# Patient Record
Sex: MALE | Race: WHITE | Employment: FULL TIME | URBAN - METROPOLITAN AREA
[De-identification: names, ages, dates, MRNs, and addresses within clinical notes are randomized per-mention and may not be internally consistent; named-entity substitution may affect disease eponyms.]

---

## 2017-02-21 PROBLEM — Z78.9 ALCOHOL CONSUMPTION TWO TO FOUR DAYS PER WEEK: Status: ACTIVE | Noted: 2017-02-21

## 2017-02-21 PROBLEM — G47.00 INSOMNIA: Status: ACTIVE | Noted: 2017-02-21

## 2017-02-21 PROBLEM — M48.061 LUMBAR CANAL STENOSIS: Status: ACTIVE | Noted: 2017-02-21

## 2017-02-21 PROBLEM — R79.89 ELEVATED LFTS: Status: ACTIVE | Noted: 2017-02-21

## 2020-08-19 ENCOUNTER — OFFICE VISIT (OUTPATIENT)
Dept: ORTHOPEDIC SURGERY | Age: 60
End: 2020-08-19
Payer: COMMERCIAL

## 2020-08-19 VITALS — TEMPERATURE: 97.9 F

## 2020-08-19 PROCEDURE — 20550 NJX 1 TENDON SHEATH/LIGAMENT: CPT | Performed by: PHYSICIAN ASSISTANT

## 2020-08-19 PROCEDURE — 99203 OFFICE O/P NEW LOW 30 MIN: CPT | Performed by: PHYSICIAN ASSISTANT

## 2020-08-19 RX ORDER — BETAMETHASONE SODIUM PHOSPHATE AND BETAMETHASONE ACETATE 3; 3 MG/ML; MG/ML
0.5 INJECTION, SUSPENSION INTRA-ARTICULAR; INTRALESIONAL; INTRAMUSCULAR; SOFT TISSUE ONCE
Status: COMPLETED | OUTPATIENT
Start: 2020-08-19 | End: 2020-08-19

## 2020-08-19 RX ORDER — LIDOCAINE HYDROCHLORIDE 10 MG/ML
1 INJECTION, SOLUTION EPIDURAL; INFILTRATION; INTRACAUDAL; PERINEURAL ONCE
Status: COMPLETED | OUTPATIENT
Start: 2020-08-19 | End: 2020-08-19

## 2020-08-19 RX ADMIN — LIDOCAINE HYDROCHLORIDE 1 ML: 10 INJECTION, SOLUTION EPIDURAL; INFILTRATION; INTRACAUDAL; PERINEURAL at 14:36

## 2020-08-19 RX ADMIN — BETAMETHASONE SODIUM PHOSPHATE AND BETAMETHASONE ACETATE 0.48 MG: 3; 3 INJECTION, SUSPENSION INTRA-ARTICULAR; INTRALESIONAL; INTRAMUSCULAR; SOFT TISSUE at 14:35

## 2020-08-19 ASSESSMENT — ENCOUNTER SYMPTOMS
VOMITING: 0
NAUSEA: 0
COLOR CHANGE: 0

## 2020-08-19 NOTE — PROGRESS NOTES
Patient ID: Ethan Swann is a 61 y.o. male. Chief Complaint   Patient presents with    Hand Pain     left hand pain no testing         HPI  Mr. Sarabjit Sanabria is a gentleman who presents for evaluation of chronic left thumb pain. Patient states he has had this pain for approximately 1 year however over the past several months it has progressively worsened. Pain is most severe to the radial aspect of the wrist and the base of the thumb. It is aggravated by specific movements of the thumb. Pain is relieved by bracing, icing and rest.  Patient states about the time the pain started he believes he hyperextended his thumb when attempting to put on a tight glove at work and since then has had pain. He denies any numbness or tingling in the hand. He denies any pain to any of the digits or the wrist itself. Past Medical History:   Diagnosis Date    Cerebral concussion 1992    Chronic back pain     DDD    Headache(784.0)     migraines    Kidney stone     Lumbar canal stenosis      Past Surgical History:   Procedure Laterality Date    CARPAL TUNNEL RELEASE Left     CARPAL TUNNEL RELEASE Right 7/21/14    COLONOSCOPY  2013    No polyps, Dr. Alysa Huizar.  HEMORRHOID SURGERY  2009    PILONIDAL CYST EXCISION  1980    SPINE SURGERY Left 1983    L5-S1     Family History   Problem Relation Age of Onset    Diabetes Mother     Diabetes Father     Diabetes Sister     Cancer Sister 36        Colon. Passed around age 36. Social History     Occupational History    Not on file   Tobacco Use    Smoking status: Never Smoker    Smokeless tobacco: Never Used   Substance and Sexual Activity    Alcohol use: Yes     Alcohol/week: 0.0 standard drinks     Comment: weekends    Drug use: No    Sexual activity: Not on file        Review of Systems   Constitutional: Negative for chills and fever. Cardiovascular: Negative for chest pain. Gastrointestinal: Negative for nausea and vomiting.    Musculoskeletal: Positive for arthralgias (Left thumb). Negative for gait problem. Skin: Negative for color change and rash. Neurological: Negative for weakness and numbness. Physical Exam  Constitutional:       General: He is not in acute distress. Appearance: Normal appearance. He is well-developed. Neck:      Musculoskeletal: Normal range of motion and neck supple. Musculoskeletal:      Comments: left Hand/Wrist   Tenderness: Moderate tenderness to the first dorsal compartment. No tenderness to first MP or IP joints. No tenderness to anatomical snuffbox or scaphoid tubercle. No tenderness to distal radius, DRUJ or ulnar styloid     Range of Motion:     Pronation: 90    Supination: 90    Flexion: 80    Extension: 80    Hand Joints: Normal-mild pain with resisted abduction otherwise normal range of motion of the thumb     Muscle Strength     : 5/5    Wrist Extension: 5/5    Wrist Flexion: 5/5     Sensation: normal  Phalen's Sign: Negative  Tinel's Sign (Medial Nerve): Negative  Finkelstein's Test: Positive     Skin:     General: Skin is warm and dry. Neurological:      Mental Status: He is alert and oriented to person, place, and time. Assessment:     Encounter Diagnoses   Name Primary?  Left hand pain Yes    De Quervain's tenosynovitis, left          X-Rays taken in clinic today and preliminarily reviewed by me  3 views of the left hand demonstrate normal osseous alignment without any evidence of acute fracture seen. No significant degenerative changes present. Unremarkable left hand x-ray  Plan:     Mr. Regla Herrera is a 51-year-old gentleman who presents for a one-year history of left thumb pain consistent with de Quervain's tenosynovitis. Educated on the anatomy of the area as well as the extent of the issue. Discussed both nonoperative and operative intervention at this time.   I would patient will benefit from conservative management and the following recommendations are made    [facility] Quervain's injection as outlined below    Thumb spica splint    Home exercise program provided    Return to clinic in 3 weeks however should patient have any questions or concerns he may return sooner if desired    Procedure: Left 1st dorsal compartment injection  An informed verbal consent for the procedure was obtained and risks including, but not limited to: allergy to medications, injection, bleeding, stiffness of joint, recurrence of symptoms, loss of function, swelling, drainage, irrigation, need for surgery and pseudo-septic inflammation, were explained to the patient. Also, discussed was the potential for further injections, irrigation and debridement and surgery. Alternate means of treatment have also been discussed with the patient. Under sterile conditions the left first dorsal compartment is prepped with Betadine and alcohol. Using a 25-gauge needle a 1.5 cc mixture of 1 cc of 1% lidocaine without epinephrine and 0.5 cc 6 mg/mL Celestone. A bandage is applied to the injection site. Patient tolerated procedure well. Orders Placed This Encounter   Procedures    XR HAND LEFT (MIN 3 VIEWS)     Standing Status:   Future     Number of Occurrences:   1     Standing Expiration Date:   8/19/2021        ERIN Corral      Please note that this chart was generated using voicerecognition Dragon dictation software. Although every effort was made to ensurethe accuracy of this automated transcription, some errors in transcription may haveoccurred.

## 2020-10-30 ENCOUNTER — OFFICE VISIT (OUTPATIENT)
Dept: ORTHOPEDIC SURGERY | Age: 60
End: 2020-10-30
Payer: COMMERCIAL

## 2020-10-30 VITALS — RESPIRATION RATE: 14 BRPM | HEIGHT: 66 IN | WEIGHT: 170 LBS | BODY MASS INDEX: 27.32 KG/M2 | TEMPERATURE: 98 F

## 2020-10-30 PROCEDURE — 99213 OFFICE O/P EST LOW 20 MIN: CPT | Performed by: PHYSICIAN ASSISTANT

## 2020-10-30 PROCEDURE — 20550 NJX 1 TENDON SHEATH/LIGAMENT: CPT | Performed by: PHYSICIAN ASSISTANT

## 2020-10-30 RX ORDER — LIDOCAINE HYDROCHLORIDE 10 MG/ML
4 INJECTION, SOLUTION INFILTRATION; PERINEURAL ONCE
Status: COMPLETED | OUTPATIENT
Start: 2020-10-30 | End: 2020-10-30

## 2020-10-30 RX ORDER — BETAMETHASONE SODIUM PHOSPHATE AND BETAMETHASONE ACETATE 3; 3 MG/ML; MG/ML
6 INJECTION, SUSPENSION INTRA-ARTICULAR; INTRALESIONAL; INTRAMUSCULAR; SOFT TISSUE ONCE
Status: COMPLETED | OUTPATIENT
Start: 2020-10-30 | End: 2020-10-30

## 2020-10-30 RX ADMIN — BETAMETHASONE SODIUM PHOSPHATE AND BETAMETHASONE ACETATE 6 MG: 3; 3 INJECTION, SUSPENSION INTRA-ARTICULAR; INTRALESIONAL; INTRAMUSCULAR; SOFT TISSUE at 10:00

## 2020-10-30 RX ADMIN — LIDOCAINE HYDROCHLORIDE 2 ML: 10 INJECTION, SOLUTION INFILTRATION; PERINEURAL at 10:00

## 2020-10-30 NOTE — PROGRESS NOTES
321 [facility], [address] [place] [address] [facility], [address] [place] [address], [id number] [place] [address]           Dept Phone: [phone]           Dept Fax:  [phone]           Demetrius Lui          Dept Phone: [phone]           Dept Fax:  [phone]      Chief Compliant:  Chief Complaint   Patient presents with    Follow-up     Follow up from Left 1st dorsal compartment injection to left thumb on 8/19/20        History of Present Illness: This is a 61 y.o. male who presents to the clinic today for follow-up of left thumb pain. Patient was initially evaluated by me on 8/19/2020 for this left thumb pain that was consistent with de Quervain's tenosynovitis. At that time he underwent a first dorsal compartment injection and was fitted for a thumb spica brace given home exercise program.  He states this injection that he received at that time did provide significant relief of pain for approximately 1 month however his pain has gradually returned over the last 6 weeks. Despite thumb spica splint at night and home exercise program patient states his pain continues to be in the same area of the thumb. He denies any recurrent injury or trauma. He denies any knee joint warmth, redness, fever chills or numbness or tingling in the left upper extremity. Patient is requesting a repeat injection today. Past History:    Current Outpatient Medications:     ketorolac (TORADOL) 10 MG tablet, Take 10 mg by mouth every 6 hours as needed for Pain, Disp: , Rfl:     Cetirizine HCl (ZYRTEC ALLERGY) 10 MG CAPS, Take 1 tablet by mouth daily. (Patient not taking: Reported on 10/30/2020), Disp: 30 capsule, Rfl: 5  Allergies   Allergen Reactions    Pcn [Penicillins] Other (See Comments)     Questionable. Patient states he was [employer]" and doesn't remember the reaction.     Terramycin [Oxytetracycline] Other (See Comments)     Questionable. Patient states he was [employer]" and doesn't remember the reaction. Social History     Socioeconomic History    Marital status:      Spouse name: Not on file    Number of children: Not on file    Years of education: Not on file    Highest education level: Not on file   Occupational History    Not on file   Social Needs    Financial resource strain: Not on file    Food insecurity     Worry: Not on file     Inability: Not on file    Transportation needs     Medical: Not on file     Non-medical: Not on file   Tobacco Use    Smoking status: Never Smoker    Smokeless tobacco: Never Used   Substance and Sexual Activity    Alcohol use: Yes     Alcohol/week: 0.0 standard drinks     Comment: weekends    Drug use: No    Sexual activity: Not on file   Lifestyle    Physical activity     Days per week: Not on file     Minutes per session: Not on file    Stress: Not on file   Relationships    Social connections     Talks on phone: Not on file     Gets together: Not on file     Attends Buddhist service: Not on file     Active member of club or organization: Not on file     Attends meetings of clubs or organizations: Not on file     Relationship status: Not on file    Intimate partner violence     Fear of current or ex partner: Not on file     Emotionally abused: Not on file     Physically abused: Not on file     Forced sexual activity: Not on file   Other Topics Concern    Not on file   Social History Narrative    Not on file     Past Medical History:   Diagnosis Date    Cerebral concussion 1992    Chronic back pain     DDD    Headache(784.0)     migraines    Kidney stone     Lumbar canal stenosis      Past Surgical History:   Procedure Laterality Date    CARPAL TUNNEL RELEASE Left     CARPAL TUNNEL RELEASE Right 7/21/14    COLONOSCOPY  2013    No polyps, Dr. Ghada Dillon.     HEMORRHOID SURGERY  2009    PILONIDAL CYST EXCISION 36    SPINE SURGERY Left 1983    L5-S1     Family History   Problem Relation Age of Onset    Diabetes Mother     Diabetes Father     Diabetes Sister     Cancer Sister 36        Colon. Passed around age 36. Review of Systems   Constitutional: Negative for fever, chills, sweats. Eyes: Negative for changes in vision, or pain. HENT: Negative for ear ache, epistaxis, or sore throat. Respiratory/Cardio: Negative for Chest pain, palpitations, SOB, or cough. Gastrointestinal: Negative for abdominal pain, N/V/D. Genitourinary: Negative for dysuria, frequency, urgency, or hematuria. Neurological: Negative for headache, numbness, or weakness. Integumentary: Negative for rash, itching, laceration, or abrasion. Musculoskeletal: Positive for Follow-up (Follow up from Left 1st dorsal compartment injection to left thumb on 8/19/20)       Physical Exam:  Constitutional: Patient is oriented to person, place, and time. Patient appears well-developed and well nourished. HENT: Negative otherwise noted  Head: Normocephalic and Atraumatic  Nose: Normal  Eyes: Conjunctivae and EOM are normal  Neck: Normal range of motion Neck supple. Respiratory/Cardio: Effort normal. No respiratory distress. Musculoskeletal:    left Hand/Wrist    Tenderness:   Focal tenderness to the left first dorsal compartment. No tenderness to the anatomical snuffbox, scaphoid tubercle, radial styloid, DRUJ, ulnar styloid or any of the metacarpals or MP joints. Range of Motion:      Pronation: 90     Supination: 90     Flexion: 80     Extension: 70     Hand Joints: normal       Muscle Strength      : 5/5     Wrist Extension: 5/5     Wrist Flexion: 5/5       Sensation: normal   Phalen's Sign: Negative   Tinel's Sign (Medial Nerve): Negative   Finkelstein's Test: Positive     Neurological: Patient is alert and oriented to person, place, and time. Normal strenght. No sensory deficit.   Skin: Skin is warm and dry  Psychiatric: Behavior is normal. Thought content normal.  Nursing note and vitals reviewed. Labs and Imaging:     XR taken today:  No results found. Orders Placed This Encounter   Procedures    External Referral To Physical Therapy     Referral Priority:   Routine     Referral Type:   Eval and Treat     Referral Reason:   Specialty Services Required     Requested Specialty:   Physical Therapy     Number of Visits Requested:   1       Assessment and Plan:  1. De Quervain's tenosynovitis, left          PLAN:  This is a 61 y.o. male who presents to the clinic today for left de Quervain's tenosynovitis. 1.  Underwent repeat first dorsal compartment injection today in the left hand as outlined below  2. Continue with thumb spica splinting  3. Recommend referral to physical therapy for strengthening and range of motion  4. Return to clinic in 6 weeks for reevaluation        Procedure Note: Left first dorsal compartment Celestone Injection   An informed verbal consent for the procedure was obtained and risks including, but not limited to: allergy to medications, injection, bleeding, stiffness of joint, recurrence of symptoms, loss of function, swelling, drainage, irrigation, need for surgery and pseudo-septic inflammation, were explained to the patient. Also, discussed was the potential for further injections, irrigation and debridement and surgery. Alternate means of treatment have also been discussed with the patient. Following an appropriate discussion with the patient regarding the risks and benefits of the procedure he consented to proceed. his left 1st dorsal compartment of left wrist was prepped using betadine solution and alcohol swab. his left wrist was injected into the tendon sheath with a 2 cc mixture of 2 cc of 1% lidocaine without epinephrine and 1 cc of 6 mg/mL Celestone into the tendon sheath with careful avoidance of the tendon itself. A band aid was applied to the injection site.  he tolerated the injection with no immediate adverse reactions. Please note that this chart was generated using voice recognition Dragon dictation software. Although every effort was made to ensure the accuracy of this automated transcription, some errors in transcription may have occurred.

## 2021-03-31 ENCOUNTER — OFFICE VISIT (OUTPATIENT)
Dept: ORTHOPEDIC SURGERY | Age: 61
End: 2021-03-31
Payer: COMMERCIAL

## 2021-03-31 DIAGNOSIS — M65.4 DE QUERVAIN'S TENOSYNOVITIS, LEFT: Primary | ICD-10-CM

## 2021-03-31 DIAGNOSIS — M79.642 LEFT HAND PAIN: ICD-10-CM

## 2021-03-31 PROCEDURE — 99214 OFFICE O/P EST MOD 30 MIN: CPT | Performed by: PHYSICIAN ASSISTANT

## 2021-03-31 PROCEDURE — 20605 DRAIN/INJ JOINT/BURSA W/O US: CPT | Performed by: PHYSICIAN ASSISTANT

## 2021-03-31 RX ORDER — BETAMETHASONE SODIUM PHOSPHATE AND BETAMETHASONE ACETATE 3; 3 MG/ML; MG/ML
3 INJECTION, SUSPENSION INTRA-ARTICULAR; INTRALESIONAL; INTRAMUSCULAR; SOFT TISSUE ONCE
Status: COMPLETED | OUTPATIENT
Start: 2021-03-31 | End: 2021-03-31

## 2021-03-31 RX ORDER — LIDOCAINE HYDROCHLORIDE 10 MG/ML
1 INJECTION, SOLUTION INFILTRATION; PERINEURAL ONCE
Status: COMPLETED | OUTPATIENT
Start: 2021-03-31 | End: 2021-03-31

## 2021-03-31 RX ADMIN — LIDOCAINE HYDROCHLORIDE 1 ML: 10 INJECTION, SOLUTION INFILTRATION; PERINEURAL at 16:43

## 2021-03-31 RX ADMIN — BETAMETHASONE SODIUM PHOSPHATE AND BETAMETHASONE ACETATE 3 MG: 3; 3 INJECTION, SUSPENSION INTRA-ARTICULAR; INTRALESIONAL; INTRAMUSCULAR; SOFT TISSUE at 16:42

## 2021-04-01 NOTE — PROGRESS NOTES
tenderness of third dorsal compartment of the base of thumb. No radius tenderness or scaphoid tenderness. Range of Motion:      Pronation: 90     Supination: 90     Flexion: 80     Extension: 50     Hand Joints: normal-pain with thumb extension       Muscle Strength      : 5/5     Wrist Extension: 5/5     Wrist Flexion: 5/5       Sensation: normal   Phalen's Sign: Negative   Tinel's Sign (Medial Nerve): Negative   Finkelstein's Test: Positive     Neurological: Patient is alert and oriented to person, place, and time. Normal strenght. No sensory deficit. Skin: Skin is warm and dry  Psychiatric: Behavior is normal. Thought content normal.  Nursing note and vitals reviewed. Labs and Imaging:     XR taken today:  No results found. Assessment and Plan:  1. De Quervain's tenosynovitis, left    2. Left hand pain          PLAN:  This is a 64 y.o. male who presents to the clinic today for follow up de Quervain's tenosynovitis. 1.  Patient has benefited from cortisone injection for this in the past most recently on 10/30/2020. He is requesting a repeat injection. 2.  Celestone injection given as outlined below. Procedure Note: Left first dorsal compartment Celestone Injection   An informed verbal consent for the procedure was obtained and risks including, but not limited to: allergy to medications, injection, bleeding, stiffness of joint, recurrence of symptoms, loss of function, swelling, drainage, irrigation, need for surgery and pseudo-septic inflammation, were explained to the patient. Also, discussed was the potential for further injections, irrigation and debridement and surgery.  Alternate means of treatment have also been discussed with the patient.        Administrations This Visit     betamethasone acetate-betamethasone sodium phosphate (CELESTONE) injection 3 mg     Admin Date  03/31/2021 Action  Given Dose  3 mg Route  Intra-articular Administered By  Deidra Plaza LPN lidocaine 1 % injection 1 mL     Admin Date  03/31/2021 Action  Given Dose  1 mL Route  Intra-articular Administered By  Hunter Becerra LPN                   Following an appropriate discussion with the patient regarding the risks and benefits of the procedure he consented to proceed. his left 1st dorsal compartment of left wrist was prepped using betadine solution and alcohol swab. his left wrist was injected into the tendon sheath with a 1.5cc mixture  of 1% lidocaine without epinephrine and 0.5 cc of 6 mg/mL Celestone into the tendon sheath with careful avoidance of the tendon itself. A band aid was applied to the injection site. he tolerated the injection with no immediate adverse reactions. Please note that this chart was generated using voice recognition Dragon dictation software. Although every effort was made to ensure the accuracy of this automated transcription, some errors in transcription may have occurred.

## 2023-05-24 ENCOUNTER — OFFICE VISIT (OUTPATIENT)
Dept: ORTHOPEDIC SURGERY | Age: 63
End: 2023-05-24
Payer: COMMERCIAL

## 2023-05-24 VITALS — RESPIRATION RATE: 14 BRPM | BODY MASS INDEX: 27.32 KG/M2 | HEIGHT: 66 IN | WEIGHT: 170 LBS

## 2023-05-24 DIAGNOSIS — M65.331 ACQUIRED TRIGGER FINGER OF BOTH MIDDLE FINGERS: Primary | ICD-10-CM

## 2023-05-24 DIAGNOSIS — M65.332 ACQUIRED TRIGGER FINGER OF BOTH MIDDLE FINGERS: Primary | ICD-10-CM

## 2023-05-24 PROCEDURE — 99213 OFFICE O/P EST LOW 20 MIN: CPT | Performed by: PHYSICIAN ASSISTANT

## 2023-05-24 NOTE — PROGRESS NOTES
1756 [facility], 20 [facility] 344 Yodit Rojas, 74 [facility], [id number] [place] [address]           Dept Phone: [phone]           Dept Fax:  [phone]           Demetrius Lui          Dept Phone: [phone]           Dept Fax:  [phone]      Chief Compliant:  Chief Complaint   Patient presents with    Hand Pain     Bilateral         History of Present Illness:  Antoinette Venegas returns today. This is a 61 y.o. male who presents to the clinic today for follow up of bilateral trigger fingers of the middle fingers of both hands. Patient last seen on 4/12/2023 and underwent bilateral middle trigger finger injections. Patient reports he tolerated the injections well and notes significant relief of pain. He denies any pain whatsoever in the left hand which she reports was actually worse before the injection but does note some intermittent triggering of the right hand especially in the morning but very little pain associated with this. .       Review of Systems   Constitutional: Negative for fever, chills, sweats, recent illness, or recent injury. Neurological: Negative for headaches, numbness, or weakness. Integumentary: Negative for rash, itching, ecchymosis, abrasions, or laceration. Musculoskeletal: Positive for Hand Pain (Bilateral )       Physical Exam:  Constitutional: Patient is oriented to person, place, and time. Patient appears well-developed and well nourished. Musculoskeletal:    Bilateral Hand  Circulation:  warm, well perfused, brisk capillary refill with normal radial and ulnar pulses   Sensation:    intact to light touch   Strength:   Normal wrist and finger strength to flexion and extension   Wrist ROM:  100% of normal on flexion and extension   Finger ROM:   100% of normal on flexion   Power :   No catching, locking of the Middle finger.

## 2023-09-13 ENCOUNTER — OFFICE VISIT (OUTPATIENT)
Dept: ORTHOPEDIC SURGERY | Age: 63
End: 2023-09-13
Payer: COMMERCIAL

## 2023-09-13 DIAGNOSIS — M65.332 ACQUIRED TRIGGER FINGER OF BOTH MIDDLE FINGERS: Primary | ICD-10-CM

## 2023-09-13 DIAGNOSIS — M65.331 ACQUIRED TRIGGER FINGER OF BOTH MIDDLE FINGERS: Primary | ICD-10-CM

## 2023-09-13 PROCEDURE — 99203 OFFICE O/P NEW LOW 30 MIN: CPT | Performed by: ORTHOPAEDIC SURGERY

## 2023-09-13 NOTE — PROGRESS NOTES
Tamar Cheney M.D.            1600 [facility], 230 [facility], [address] [place] [address] [address] [place]           Dept Phone: [phone]           Dept Fax:  [address]          Dept Phone: [phone]           Dept Fax:  [phone]      Chief Compliant:  Chief Complaint   Patient presents with    Pain     Trigger finger        History of Present Illness: This is a 61 y.o. male who presents to the clinic today for evaluation / follow up of right long finger locking. Patient is a 61-year-old right-hand-dominant patient who states that he has had locking of his long finger for some time. He occasionally wakes up to his waist painful for him to unlock at times he also has some of this on his left long but this was not long locking form. .       Review of Systems   Constitutional: Negative for fever, chills, sweats. Eyes: Negative for changes in vision, or pain. HENT: Negative for ear ache, epistaxis, or sore throat. Respiratory/Cardio: Negative for Chest pain, palpitations, SOB, or cough. Gastrointestinal: Negative for abdominal pain, N/V/D. Genitourinary: Negative for dysuria, frequency, urgency, or hematuria. Neurological: Negative for headache, numbness, or weakness. Integumentary: Negative for rash, itching, laceration, or abrasion. Musculoskeletal: Positive for Pain (Trigger finger)       Physical Exam:  Constitutional: Patient is oriented to person, place, and time. Patient appears well-developed and well nourished. HENT: Negative otherwise noted  Head: Normocephalic and Atraumatic  Nose: Normal  Eyes: Conjunctivae and EOM are normal  Neck: Normal range of motion Neck supple. Respiratory/Cardio: Effort normal. No respiratory distress.   Musculoskeletal: Physical examination of patient's right hand and notes palpable nodule in the

## 2023-09-21 ENCOUNTER — HOSPITAL ENCOUNTER (OUTPATIENT)
Dept: PREADMISSION TESTING | Age: 63
Discharge: HOME | End: 2023-09-25

## 2023-09-21 VITALS — BODY MASS INDEX: 24.11 KG/M2 | HEIGHT: 66 IN | WEIGHT: 150 LBS

## 2023-09-21 NOTE — PROGRESS NOTES
Pre-op Instructions For Out-Patient Surgery    Medication Instructions:  Please stop herbs and any supplements now (includes vitamins and minerals). Please contact your surgeon and prescribing physician for pre-op instructions for any blood thinners. If you have inhalers/aerosol treatments at home, please use them the morning of your surgery and bring the inhalers with you to the hospital.    Please take the following medications the morning of your surgery with a sip of water:    none    Surgery Instructions:  After midnight before surgery:  Do not eat or drink anything, including water, mints, gum, and hard candy. You may brush your teeth without swallowing. No smoking, chewing tobacco, or street drugs. Please shower or bathe before surgery. If you were given Surgical Scrub Chlorhexidine Gluconate Liquid (CHG), please shower the night before and the morning of your surgery following the detailed instructions you received during your pre-admission visit. Felicia Delgadillo will  from the hospital.  Please do not wear any cologne, lotion, powder, deodorant, jewelry, piercings, perfume, makeup, nail polish, hair accessories, or hair spray on the day of surgery. Wear loose comfortable clothing. Leave your valuables at home but bring a payment source for any after-surgery prescriptions you plan to fill at [facility]. Bring a storage case for any glasses/contacts. An adult who is responsible for you MUST drive you home and should be with you for the first 24 hours after surgery. If having out-patient knee and foot surgeries, please arrange for planned crutches, walker, or wheelchair before arriving to the hospital.    The Day of Surgery:  Arrive at [facility] Surgery Entrance at the time directed by your surgeon and check in at the desk. If you have a living will or healthcare power of , please bring a copy.     You will be taken to the

## 2023-10-04 ENCOUNTER — ANESTHESIA EVENT (OUTPATIENT)
Dept: OPERATING ROOM | Age: 63
End: 2023-10-04
Payer: COMMERCIAL

## 2023-10-04 NOTE — PRE-PROCEDURE INSTRUCTIONS
No answer, left message ? -YES                            Unable to leave message ? When were you told to arrive at hospital ? -0700     Do you have a  ? Are you on any blood thinners ? If yes when did you stop taking ? Do you have your prep Rx filled and instruction ? Nothing to eat the day before , only clear liquids. Are you experiencing any covid symptoms ? Do you have any infections or rash we should be aware of ? Do you have the Hibiclens soap to use the night before and the morning of surgery ? Nothing to eat or drink after midnight, only a sip of water to take any medication instructed to take the night before. Wear comfortable clothing, leave any valuables at home, remove any jewelry and body piercing .

## 2023-10-05 ENCOUNTER — ANESTHESIA (OUTPATIENT)
Dept: OPERATING ROOM | Age: 63
End: 2023-10-05
Payer: COMMERCIAL

## 2023-10-05 ENCOUNTER — HOSPITAL ENCOUNTER (OUTPATIENT)
Age: 63
Setting detail: OUTPATIENT SURGERY
Discharge: HOME | End: 2023-10-05
Attending: ORTHOPAEDIC SURGERY | Admitting: ORTHOPAEDIC SURGERY
Payer: COMMERCIAL

## 2023-10-05 VITALS
DIASTOLIC BLOOD PRESSURE: 67 MMHG | TEMPERATURE: 97.1 F | HEIGHT: 66 IN | RESPIRATION RATE: 16 BRPM | BODY MASS INDEX: 24.11 KG/M2 | OXYGEN SATURATION: 96 % | SYSTOLIC BLOOD PRESSURE: 109 MMHG | HEART RATE: 48 BPM | WEIGHT: 150 LBS

## 2023-10-05 DIAGNOSIS — M65.331 TRIGGER MIDDLE FINGER OF RIGHT HAND: Primary | ICD-10-CM

## 2023-10-05 PROCEDURE — 6360000002 HC RX W HCPCS: Performed by: NURSE ANESTHETIST, CERTIFIED REGISTERED

## 2023-10-05 PROCEDURE — 6370000000 HC RX 637 (ALT 250 FOR IP): Performed by: ANESTHESIOLOGY

## 2023-10-05 PROCEDURE — 3700000001 HC ADD 15 MINUTES (ANESTHESIA): Performed by: ORTHOPAEDIC SURGERY

## 2023-10-05 PROCEDURE — 2580000003 HC RX 258: Performed by: ANESTHESIOLOGY

## 2023-10-05 PROCEDURE — 6360000002 HC RX W HCPCS: Performed by: ORTHOPAEDIC SURGERY

## 2023-10-05 PROCEDURE — 7100000010 HC PHASE II RECOVERY - FIRST 15 MIN: Performed by: ORTHOPAEDIC SURGERY

## 2023-10-05 PROCEDURE — 3600000012 HC SURGERY LEVEL 2 ADDTL 15MIN: Performed by: ORTHOPAEDIC SURGERY

## 2023-10-05 PROCEDURE — 7100000011 HC PHASE II RECOVERY - ADDTL 15 MIN: Performed by: ORTHOPAEDIC SURGERY

## 2023-10-05 PROCEDURE — 7100000000 HC PACU RECOVERY - FIRST 15 MIN: Performed by: ORTHOPAEDIC SURGERY

## 2023-10-05 PROCEDURE — 7100000001 HC PACU RECOVERY - ADDTL 15 MIN: Performed by: ORTHOPAEDIC SURGERY

## 2023-10-05 PROCEDURE — 2500000003 HC RX 250 WO HCPCS: Performed by: NURSE ANESTHETIST, CERTIFIED REGISTERED

## 2023-10-05 PROCEDURE — 2709999900 HC NON-CHARGEABLE SUPPLY: Performed by: ORTHOPAEDIC SURGERY

## 2023-10-05 PROCEDURE — 7100000030 HC ASPR PHASE II RECOVERY - FIRST 15 MIN: Performed by: ORTHOPAEDIC SURGERY

## 2023-10-05 PROCEDURE — 3600000002 HC SURGERY LEVEL 2 BASE: Performed by: ORTHOPAEDIC SURGERY

## 2023-10-05 PROCEDURE — 3700000000 HC ANESTHESIA ATTENDED CARE: Performed by: ORTHOPAEDIC SURGERY

## 2023-10-05 PROCEDURE — 7100000031 HC ASPR PHASE II RECOVERY - ADDTL 15 MIN: Performed by: ORTHOPAEDIC SURGERY

## 2023-10-05 RX ORDER — MIDAZOLAM HYDROCHLORIDE 1 MG/ML
INJECTION INTRAMUSCULAR; INTRAVENOUS PRN
Status: DISCONTINUED | OUTPATIENT
Start: 2023-10-05 | End: 2023-10-05 | Stop reason: SDUPTHER

## 2023-10-05 RX ORDER — MEPERIDINE HYDROCHLORIDE 25 MG/ML
12.5 INJECTION INTRAMUSCULAR; INTRAVENOUS; SUBCUTANEOUS EVERY 5 MIN PRN
Status: DISCONTINUED | OUTPATIENT
Start: 2023-10-05 | End: 2023-10-05 | Stop reason: HOSPADM

## 2023-10-05 RX ORDER — FENTANYL CITRATE 50 UG/ML
INJECTION, SOLUTION INTRAMUSCULAR; INTRAVENOUS PRN
Status: DISCONTINUED | OUTPATIENT
Start: 2023-10-05 | End: 2023-10-05 | Stop reason: SDUPTHER

## 2023-10-05 RX ORDER — ACETAMINOPHEN 500 MG
1000 TABLET ORAL EVERY 6 HOURS PRN
COMMUNITY

## 2023-10-05 RX ORDER — LIDOCAINE HYDROCHLORIDE 20 MG/ML
INJECTION, SOLUTION EPIDURAL; INFILTRATION; INTRACAUDAL; PERINEURAL PRN
Status: DISCONTINUED | OUTPATIENT
Start: 2023-10-05 | End: 2023-10-05 | Stop reason: SDUPTHER

## 2023-10-05 RX ORDER — LIDOCAINE HYDROCHLORIDE 10 MG/ML
1 INJECTION, SOLUTION EPIDURAL; INFILTRATION; INTRACAUDAL; PERINEURAL
Status: DISCONTINUED | OUTPATIENT
Start: 2023-10-05 | End: 2023-10-05 | Stop reason: HOSPADM

## 2023-10-05 RX ORDER — FENTANYL CITRATE 0.05 MG/ML
25 INJECTION, SOLUTION INTRAMUSCULAR; INTRAVENOUS EVERY 5 MIN PRN
Status: DISCONTINUED | OUTPATIENT
Start: 2023-10-05 | End: 2023-10-05 | Stop reason: HOSPADM

## 2023-10-05 RX ORDER — SODIUM CHLORIDE 0.9 % (FLUSH) 0.9 %
5-40 SYRINGE (ML) INJECTION EVERY 12 HOURS SCHEDULED
Status: DISCONTINUED | OUTPATIENT
Start: 2023-10-05 | End: 2023-10-05 | Stop reason: HOSPADM

## 2023-10-05 RX ORDER — ACETAMINOPHEN 325 MG/1
650 TABLET ORAL
Status: DISCONTINUED | OUTPATIENT
Start: 2023-10-05 | End: 2023-10-05 | Stop reason: HOSPADM

## 2023-10-05 RX ORDER — SODIUM CHLORIDE 0.9 % (FLUSH) 0.9 %
5-40 SYRINGE (ML) INJECTION PRN
Status: DISCONTINUED | OUTPATIENT
Start: 2023-10-05 | End: 2023-10-05 | Stop reason: HOSPADM

## 2023-10-05 RX ORDER — SODIUM CHLORIDE 9 MG/ML
INJECTION, SOLUTION INTRAVENOUS PRN
Status: DISCONTINUED | OUTPATIENT
Start: 2023-10-05 | End: 2023-10-05 | Stop reason: HOSPADM

## 2023-10-05 RX ORDER — ROPIVACAINE HYDROCHLORIDE 5 MG/ML
INJECTION, SOLUTION EPIDURAL; INFILTRATION; PERINEURAL PRN
Status: DISCONTINUED | OUTPATIENT
Start: 2023-10-05 | End: 2023-10-05 | Stop reason: ALTCHOICE

## 2023-10-05 RX ORDER — HYDRALAZINE HYDROCHLORIDE 20 MG/ML
10 INJECTION INTRAMUSCULAR; INTRAVENOUS
Status: DISCONTINUED | OUTPATIENT
Start: 2023-10-05 | End: 2023-10-05 | Stop reason: HOSPADM

## 2023-10-05 RX ORDER — METOCLOPRAMIDE HYDROCHLORIDE 5 MG/ML
10 INJECTION INTRAMUSCULAR; INTRAVENOUS
Status: DISCONTINUED | OUTPATIENT
Start: 2023-10-05 | End: 2023-10-05 | Stop reason: HOSPADM

## 2023-10-05 RX ORDER — ONDANSETRON 2 MG/ML
INJECTION INTRAMUSCULAR; INTRAVENOUS PRN
Status: DISCONTINUED | OUTPATIENT
Start: 2023-10-05 | End: 2023-10-05 | Stop reason: SDUPTHER

## 2023-10-05 RX ORDER — ONDANSETRON 2 MG/ML
4 INJECTION INTRAMUSCULAR; INTRAVENOUS
Status: DISCONTINUED | OUTPATIENT
Start: 2023-10-05 | End: 2023-10-05 | Stop reason: HOSPADM

## 2023-10-05 RX ORDER — LABETALOL HYDROCHLORIDE 5 MG/ML
10 INJECTION, SOLUTION INTRAVENOUS
Status: DISCONTINUED | OUTPATIENT
Start: 2023-10-05 | End: 2023-10-05 | Stop reason: HOSPADM

## 2023-10-05 RX ORDER — SODIUM CHLORIDE, SODIUM LACTATE, POTASSIUM CHLORIDE, CALCIUM CHLORIDE 600; 310; 30; 20 MG/100ML; MG/100ML; MG/100ML; MG/100ML
INJECTION, SOLUTION INTRAVENOUS CONTINUOUS
Status: DISCONTINUED | OUTPATIENT
Start: 2023-10-05 | End: 2023-10-05 | Stop reason: HOSPADM

## 2023-10-05 RX ORDER — DEXAMETHASONE SODIUM PHOSPHATE 4 MG/ML
INJECTION, SOLUTION INTRA-ARTICULAR; INTRALESIONAL; INTRAMUSCULAR; INTRAVENOUS; SOFT TISSUE PRN
Status: DISCONTINUED | OUTPATIENT
Start: 2023-10-05 | End: 2023-10-05 | Stop reason: SDUPTHER

## 2023-10-05 RX ORDER — HYDROCODONE BITARTRATE AND ACETAMINOPHEN 5; 325 MG/1; MG/1
1 TABLET ORAL
Status: DISCONTINUED | OUTPATIENT
Start: 2023-10-05 | End: 2023-10-05 | Stop reason: HOSPADM

## 2023-10-05 RX ORDER — GABAPENTIN 300 MG/1
300 CAPSULE ORAL ONCE
Status: COMPLETED | OUTPATIENT
Start: 2023-10-05 | End: 2023-10-05

## 2023-10-05 RX ORDER — HYDROCODONE BITARTRATE AND ACETAMINOPHEN 5; 325 MG/1; MG/1
1 TABLET ORAL EVERY 6 HOURS PRN
Qty: 20 TABLET | Refills: 0 | Status: SHIPPED | OUTPATIENT
Start: 2023-10-05 | End: 2023-10-10

## 2023-10-05 RX ORDER — DIPHENHYDRAMINE HYDROCHLORIDE 50 MG/ML
12.5 INJECTION INTRAMUSCULAR; INTRAVENOUS
Status: DISCONTINUED | OUTPATIENT
Start: 2023-10-05 | End: 2023-10-05 | Stop reason: HOSPADM

## 2023-10-05 RX ORDER — ACETAMINOPHEN 500 MG
1000 TABLET ORAL ONCE
Status: COMPLETED | OUTPATIENT
Start: 2023-10-05 | End: 2023-10-05

## 2023-10-05 RX ORDER — PROPOFOL 10 MG/ML
INJECTION, EMULSION INTRAVENOUS PRN
Status: DISCONTINUED | OUTPATIENT
Start: 2023-10-05 | End: 2023-10-05 | Stop reason: SDUPTHER

## 2023-10-05 RX ADMIN — LIDOCAINE HYDROCHLORIDE 80 MG: 20 INJECTION, SOLUTION EPIDURAL; INFILTRATION; INTRACAUDAL; PERINEURAL at 08:21

## 2023-10-05 RX ADMIN — FENTANYL CITRATE 50 MCG: 50 INJECTION, SOLUTION INTRAMUSCULAR; INTRAVENOUS at 08:21

## 2023-10-05 RX ADMIN — GABAPENTIN 300 MG: 300 CAPSULE ORAL at 07:45

## 2023-10-05 RX ADMIN — SODIUM CHLORIDE, POTASSIUM CHLORIDE, SODIUM LACTATE AND CALCIUM CHLORIDE: 600; 310; 30; 20 INJECTION, SOLUTION INTRAVENOUS at 07:43

## 2023-10-05 RX ADMIN — ONDANSETRON 4 MG: 2 INJECTION INTRAMUSCULAR; INTRAVENOUS at 08:26

## 2023-10-05 RX ADMIN — ACETAMINOPHEN 1000 MG: 500 TABLET ORAL at 07:45

## 2023-10-05 RX ADMIN — DEXAMETHASONE SODIUM PHOSPHATE 4 MG: 4 INJECTION INTRA-ARTICULAR; INTRALESIONAL; INTRAMUSCULAR; INTRAVENOUS; SOFT TISSUE at 08:26

## 2023-10-05 RX ADMIN — MIDAZOLAM 2 MG: 1 INJECTION INTRAMUSCULAR; INTRAVENOUS at 08:17

## 2023-10-05 RX ADMIN — PROPOFOL 200 MG: 10 INJECTION, EMULSION INTRAVENOUS at 08:21

## 2023-10-05 ASSESSMENT — PAIN SCALES - GENERAL
PAINLEVEL_OUTOF10: 0
PAINLEVEL_OUTOF10: 0

## 2023-10-05 ASSESSMENT — PAIN - FUNCTIONAL ASSESSMENT: PAIN_FUNCTIONAL_ASSESSMENT: NONE - DENIES PAIN

## 2023-10-05 ASSESSMENT — LIFESTYLE VARIABLES: SMOKING_STATUS: 0

## 2023-10-05 ASSESSMENT — ENCOUNTER SYMPTOMS
STRIDOR: 0
SHORTNESS OF BREATH: 0

## 2023-10-05 NOTE — H&P
HISTORY and [address]       NAME:  Chanell Ahumada  MRN: [id number]   YOB: 1960   Date: 10/5/2023   Age: 61 y.o. Gender: male       COMPLAINT AND PRESENT HISTORY:      Chanell Ahumada is a 61 y.o., male, undergoing preadmission testing for Pre-Op Diagnosis Codes:     * Trigger finger, right middle finger     Patient will be havin [address] (MIDDLE FINGER}    Office note per Dr. Marlon Galindo on 23  History of Present Illness: This is a 61 y.o. male who presents to the clinic today for evaluation / follow up of right long finger locking. Patient is a 49-year-old right-hand-dominant patient who states that he has had locking of his long finger for some time. He occasionally wakes up to his waist painful for him to unlock at times he also has some of this on his left long but this was not long locking form    UPDATE  Patient reports issues with Bilateral middle fingers  for the past few years. Pt reports having carpal tunnel release done on ibrahima hands in the past ()    Patient reports limited range of motion in the right middle finger. , occasionally mild pain. Patient admits to locking and triggering of the middle finger on the right . Patient had x 2 injection done on the right finger  which provided good relief, but short lived     Patient admit to weakness of numbness in the upper extremity. Pt states that he drops things often. Patient feels well otherwise,  medical history reviewed. no recent fever/chills, chest pain, shortness of breath. Patient has been NPO since midnight. No blood thinners . Anticoagulants or blood thinners: no   Patient denies any issues with Anesthesia presently, but has hx of prolonged emergence x1 with general anesthesia.        PAST MEDICAL HISTORY     Past Medical History:   Diagnosis Date    Cerebral concussion     Chronic back pain     DDD    Diverticulosis     noted on

## 2023-10-05 NOTE — DISCHARGE INSTRUCTIONS
DISCHARGE INSTRUCTIONS FOR HAND/WRIST SURGERY    In order to continue your care at home, please follow the instructions below. For General Anesthesia  Do not drink any alcoholic beverages or make any legal or important decisions for 24 hours. Diet    After general anesthesia, start out eating lightly (broth, soup, crackers, toast, etc.) advancing as tolerated to your usual diet. Try to avoid spicy or greasy/fatty foods for 24 hours. Drink plenty of fluids after surgery, unless you are on a fluid restriction. Avoid milk/milk product for several hours. Medications  Take medications as instructed by your surgeon. Please do not take prescribed pain medication with alcoholic beverages. When cleared to drive by your surgeon, please do not drive or operate machinery while taking any prescribed pain medication. Activities  As instructed by your surgeon  Limit your activities for 24 hours  Avoid heavy work or sports until surgeon approves. No lifting, pushing, pulling, twisting, or pressing down on hand or wrist.  No driving or operating machinery until cleared by surgeon. May shower as long as dressings stay dry with plastic bag coverage. Surgery Area  Always keep dressings/incisions clean, dry. Do not remove dressings until seen in office, unless instructed otherwise by your surgeon. To reduce swelling and pain, apply an ice pack for 20-30 min 4 times a day for 48 hours and keep surgical hand/wrist elevated above heart level with pillows. Call your surgeon for the following: You have pain that does not get better after you take pain medicine. For an oral temperature (by mouth) is 101 degrees or higher, chills, or excessive sweating. You have increasing and progressive bleeding or drainage from surgery site. Signs of an infection:  increased swelling, redness, warmth, or hardness around surgery area or yellow or green drainage from incision.   If your fingers are pale, blue or cold to

## 2023-10-12 ENCOUNTER — TELEPHONE (OUTPATIENT)
Dept: ORTHOPEDIC SURGERY | Age: 63
End: 2023-10-12

## 2023-10-12 NOTE — TELEPHONE ENCOUNTER
Patient called to report his Post bandage is loose and barely attached, He is asking if it is ok to remove and if so should he wrap and cover it or keep the bandage off?  Patient can be called back at [phone]

## 2023-10-13 NOTE — TELEPHONE ENCOUNTER
The patient called in again this morning and stated that his bandage is in bad shape. He was advise to put a new clean bandage over the incision to keep it clean and dry.

## 2023-10-18 ENCOUNTER — OFFICE VISIT (OUTPATIENT)
Dept: ORTHOPEDIC SURGERY | Age: 63
End: 2023-10-18

## 2023-10-18 DIAGNOSIS — Z98.890 S/P TRIGGER FINGER RELEASE: Primary | ICD-10-CM

## 2023-10-18 PROCEDURE — 99024 POSTOP FOLLOW-UP VISIT: CPT | Performed by: ORTHOPAEDIC SURGERY

## 2023-10-18 NOTE — PROGRESS NOTES
Dangelo Dhillon returns today he status post right long trigger finger release. He says and there is no more triggering in his hand feels good    Examination notes incisions pristine.   He has full motion of his finger he has complete sensation on both sides of the long finger overall doing well    Impression  Status post right trigger finger release right long  Trigger finger left long    Plan  Patient would like to before then irrigated left side release that I told him he can call and schedule at his discretion

## 2023-10-27 ENCOUNTER — TELEPHONE (OUTPATIENT)
Dept: ORTHOPEDIC SURGERY | Age: 63
End: 2023-10-27

## 2023-10-27 NOTE — TELEPHONE ENCOUNTER
October 27, 2023  Victoriano Piedra MD  to [address]        10/27/23  9:59 AM  Have him apply Neosporin to the wound and keep covered.   If he does not start seeing closure and then he needed to be seen by myself or Preeti Jacques sometime next week

## 2023-10-27 NOTE — TELEPHONE ENCOUNTER
This patient had right trigger finger release on 10/5/23. His stitches were removed on 10/18/23 and states that the incision is opened. He also states that he has been using steri strips, but they are not helping. I advised to continue this and cover with with a bandage until I hear back from you. Please advise.

## 2023-10-27 NOTE — TELEPHONE ENCOUNTER
The patient was advised to use Neosprin and keep covered. He will call the office if it does not improve.

## 2023-10-30 ENCOUNTER — TELEPHONE (OUTPATIENT)
Dept: ORTHOPEDIC SURGERY | Age: 63
End: 2023-10-30

## 2023-10-30 NOTE — TELEPHONE ENCOUNTER
Spoke with patient this morning and he is still concerned with the stitch that didn't seem to close part of his incision. He was advised last week to apply neosporin and apply bandage, which he said he did but he still is concerned. Patient is now schedule with Angely Amin on 11/2/23 to be checked out.

## 2023-11-02 ENCOUNTER — OFFICE VISIT (OUTPATIENT)
Dept: ORTHOPEDIC SURGERY | Age: 63
End: 2023-11-02

## 2023-11-02 VITALS — HEIGHT: 66 IN | RESPIRATION RATE: 16 BRPM | BODY MASS INDEX: 24.11 KG/M2 | WEIGHT: 150 LBS

## 2023-11-02 DIAGNOSIS — Z98.890 S/P TRIGGER FINGER RELEASE: Primary | ICD-10-CM

## 2023-11-02 PROCEDURE — 99024 POSTOP FOLLOW-UP VISIT: CPT | Performed by: PHYSICIAN ASSISTANT

## 2023-11-02 NOTE — PROGRESS NOTES
[facility] [address] [facility], [address] [place] [address], [address] [place] [address] [address]           Dept Phone: [phone]           Dept Fax:  [phone] [address] [facility], [address] [facility]          Dept Phone: [phone]           Dept Fax:  [phone]      Chief Compliant:  Chief Complaint   Patient presents with    Hand Pain     Rt hand trigger finger        History of Present Illness:  Ritchie Gibbs returns today. This is a 61 y.o. male who presents to the clinic today for follow up of right middle finger trigger finger release. Date of surgery occurred on 10/5/2023. Patient was initially doing quite well however expresses some concern over the last week or so of \"opening of the incision\". He states he has not noticed any drainage or true opening of the incision but states the superficial skin does seem to be peeling away. He does admit that he has been using peroxide and Neosporin twice daily and that it has remained covered at all times since last visit. He states he is not even getting it wet in the shower. He denies any warmth, redness, fever or chills. Review of Systems   Constitutional: Negative for fever, chills, sweats, recent illness, or recent injury. Neurological: Negative for headaches, numbness, or weakness. Integumentary: Negative for rash, itching, ecchymosis, abrasions, or laceration. Musculoskeletal: Positive for Hand Pain (Rt hand trigger finger)       Physical Exam:  Constitutional: Patient is oriented to person, place, and time. Patient appears well-developed and well nourished. Musculoskeletal:    Right hand: Inspection: Long finger trigger finger release incision is well approximated and appears to be healing well.   He does have some skin that appears to be sloughing off around the incision but no evidence of incisional dehiscence or

## 2025-02-18 ENCOUNTER — OFFICE VISIT (OUTPATIENT)
Dept: ORTHOPEDIC SURGERY | Age: 65
End: 2025-02-18
Payer: MEDICARE

## 2025-02-18 VITALS — WEIGHT: 150 LBS | HEIGHT: 66 IN | BODY MASS INDEX: 24.11 KG/M2

## 2025-02-18 DIAGNOSIS — M65.341 TRIGGER RING FINGER OF RIGHT HAND: ICD-10-CM

## 2025-02-18 DIAGNOSIS — M65.352 TRIGGER FINGER, LEFT LITTLE FINGER: Primary | ICD-10-CM

## 2025-02-18 PROCEDURE — 99214 OFFICE O/P EST MOD 30 MIN: CPT | Performed by: PHYSICIAN ASSISTANT

## 2025-02-18 PROCEDURE — 20550 NJX 1 TENDON SHEATH/LIGAMENT: CPT | Performed by: PHYSICIAN ASSISTANT

## 2025-02-18 PROCEDURE — G8427 DOCREV CUR MEDS BY ELIG CLIN: HCPCS | Performed by: PHYSICIAN ASSISTANT

## 2025-02-18 PROCEDURE — 3017F COLORECTAL CA SCREEN DOC REV: CPT | Performed by: PHYSICIAN ASSISTANT

## 2025-02-18 PROCEDURE — 1036F TOBACCO NON-USER: CPT | Performed by: PHYSICIAN ASSISTANT

## 2025-02-18 PROCEDURE — 1123F ACP DISCUSS/DSCN MKR DOCD: CPT | Performed by: PHYSICIAN ASSISTANT

## 2025-02-18 PROCEDURE — G8420 CALC BMI NORM PARAMETERS: HCPCS | Performed by: PHYSICIAN ASSISTANT

## 2025-02-18 RX ORDER — BETAMETHASONE SODIUM PHOSPHATE AND BETAMETHASONE ACETATE 3; 3 MG/ML; MG/ML
6 INJECTION, SUSPENSION INTRA-ARTICULAR; INTRALESIONAL; INTRAMUSCULAR; SOFT TISSUE ONCE
Status: COMPLETED | OUTPATIENT
Start: 2025-02-18 | End: 2025-02-18

## 2025-02-18 RX ORDER — LIDOCAINE HYDROCHLORIDE 10 MG/ML
1 INJECTION, SOLUTION INFILTRATION; PERINEURAL ONCE
Status: COMPLETED | OUTPATIENT
Start: 2025-02-18 | End: 2025-02-18

## 2025-02-18 RX ADMIN — BETAMETHASONE SODIUM PHOSPHATE AND BETAMETHASONE ACETATE 6 MG: 3; 3 INJECTION, SUSPENSION INTRA-ARTICULAR; INTRALESIONAL; INTRAMUSCULAR; SOFT TISSUE at 13:06

## 2025-02-18 RX ADMIN — BETAMETHASONE SODIUM PHOSPHATE AND BETAMETHASONE ACETATE 6 MG: 3; 3 INJECTION, SUSPENSION INTRA-ARTICULAR; INTRALESIONAL; INTRAMUSCULAR; SOFT TISSUE at 13:05

## 2025-02-18 RX ADMIN — LIDOCAINE HYDROCHLORIDE 1 ML: 10 INJECTION, SOLUTION INFILTRATION; PERINEURAL at 13:10

## 2025-02-18 RX ADMIN — LIDOCAINE HYDROCHLORIDE 1 ML: 10 INJECTION, SOLUTION INFILTRATION; PERINEURAL at 13:11

## 2025-02-18 NOTE — PROGRESS NOTES
[facility], [facility]  [address] [facility]  [address] [place], [place] [address]  Dept: [phone]  Dept Fax: [phone]        Established Patient - New Problem      Subjective:   Jovanni Rivera is a 65 y.o. year old male who presents to our office today for routine followup regarding his   1. Trigger finger, left little finger    2. Trigger ring finger of right hand    .    Chief Complaint   Patient presents with    Follow-up     Bilateral hand pain         History of Present Illness  The patient is a 65-year-old male who presents today for left hand small finger and right ring finger trigger finger/locking.    He has been experiencing symptoms in his left small finger and right ring finger for several years, with a significant increase in pain noted over the past 3 to 4 months. He expresses a strong aversion to injections, even though they have provided some relief in the past. He is open to considering an injection for his right ring finger but is hesitant due to the constant clicking sensation. He reports no history of diabetes. He has a history of a right middle finger trigger finger release on 10/05/2023 with Dr. Chen.    ALLERGIES  The patient is allergic to PENICILLIN.      Review of Systems      Objective :   Ht 1.676 m (5' 5.98\")   Wt 68 kg (150 lb)   BMI 24.22 kg/m²  Body mass index is 24.22 kg/m².  General: Jovanni Rivera is a 65 y.o. male who is alert and oriented and sitting comfortably in our office.  Ortho Exam    MSK:  Evaluation of the Right hand shows no outward deformity.  Palpable nodule in the region of the A1 pulley of the Right ring finger is noted.  Active triggering of the ring finger is noted.  Motor, sensory, vascular examination to the Right upper extremity is noted to be intact without deficits.  Patient has full range of motion of the other fingers of the Right hand without tenderness

## 2025-06-19 ENCOUNTER — OFFICE VISIT (OUTPATIENT)
Dept: ORTHOPEDIC SURGERY | Age: 65
End: 2025-06-19

## 2025-06-19 VITALS — WEIGHT: 158 LBS | BODY MASS INDEX: 26.33 KG/M2 | HEIGHT: 65 IN | RESPIRATION RATE: 14 BRPM

## 2025-06-19 DIAGNOSIS — M65.341 TRIGGER RING FINGER OF RIGHT HAND: ICD-10-CM

## 2025-06-19 DIAGNOSIS — M65.352 TRIGGER FINGER, LEFT LITTLE FINGER: Primary | ICD-10-CM

## 2025-06-19 RX ORDER — BETAMETHASONE SODIUM PHOSPHATE AND BETAMETHASONE ACETATE 3; 3 MG/ML; MG/ML
3 INJECTION, SUSPENSION INTRA-ARTICULAR; INTRALESIONAL; INTRAMUSCULAR; SOFT TISSUE ONCE
Status: COMPLETED | OUTPATIENT
Start: 2025-06-19 | End: 2025-06-19

## 2025-06-19 RX ORDER — LIDOCAINE HYDROCHLORIDE 10 MG/ML
0.5 INJECTION, SOLUTION INFILTRATION; PERINEURAL ONCE
Status: COMPLETED | OUTPATIENT
Start: 2025-06-19 | End: 2025-06-19

## 2025-06-19 RX ADMIN — LIDOCAINE HYDROCHLORIDE 0.5 ML: 10 INJECTION, SOLUTION INFILTRATION; PERINEURAL at 09:39

## 2025-06-19 RX ADMIN — BETAMETHASONE SODIUM PHOSPHATE AND BETAMETHASONE ACETATE 3 MG: 3; 3 INJECTION, SUSPENSION INTRA-ARTICULAR; INTRALESIONAL; INTRAMUSCULAR; SOFT TISSUE at 09:43

## 2025-06-19 RX ADMIN — LIDOCAINE HYDROCHLORIDE 0.5 ML: 10 INJECTION, SOLUTION INFILTRATION; PERINEURAL at 09:41

## 2025-06-19 RX ADMIN — BETAMETHASONE SODIUM PHOSPHATE AND BETAMETHASONE ACETATE 3 MG: 3; 3 INJECTION, SUSPENSION INTRA-ARTICULAR; INTRALESIONAL; INTRAMUSCULAR; SOFT TISSUE at 09:44

## 2025-06-19 ASSESSMENT — ENCOUNTER SYMPTOMS
SHORTNESS OF BREATH: 0
COLOR CHANGE: 0
COUGH: 0
VOMITING: 0

## 2025-06-19 NOTE — PROGRESS NOTES
[facility]  [address] [place], [place] [id number]  Dept: [phone]  Dept Fax: [phone]        Ambulatory Follow Up      Subjective:   Jovanni Rivera is a 65 y.o. year old male who presents to our office today for routine followup regarding his   1. Trigger finger, left little finger    2. Trigger ring finger of right hand        Chief Complaint   Patient presents with    Hand Pain     F/U: Trigger ring finger of right hand. LOV on 2/18/25 received injection which gave him 80% relief for almost 4 months. He is requesting another injection today.       History of Present Illness  The patient is a 65-year-old male here today for follow-up regarding left small finger trigger finger and right ring finger trigger finger. He received trigger finger injections on 02/18/2025, which resulted in approximately 80% improvement in symptoms for nearly 4 months. He is requesting repeat injections today. There is a history of a right middle finger trigger finger release performed by Dr. Chen on 10/05/2024.    He reports that the previous injection for the right ring finger was effective in alleviating pain but did not prevent the locking of his finger. Pain is present in the left small finger, although it does not lock. He has not used a splint/brace for his condition but is open to trying one. There are no known drug allergies, and he tolerated the previous treatment well.    The right ring finger locks during sleep, requiring manual release.    PAST SURGICAL HISTORY:  Right middle finger trigger finger release on 10/05/2024.      Review of Systems   Constitutional:  Negative for activity change and fever.   HENT:  Negative for sneezing.    Respiratory:  Negative for cough and shortness of breath.    Cardiovascular:  Negative for chest pain.   Gastrointestinal:  Negative for vomiting.   Musculoskeletal:  Positive for arthralgias (right ring

## (undated) DEVICE — BANDAGE COMPR W2INXL5YD WHT BGE POLY COT M E WRP WV HK AND

## (undated) DEVICE — GLOVE ORTHO 8   MSG9480

## (undated) DEVICE — PADDING CAST W2INXL4YD WHT RAYON POLYESTER SYN N ADH RL

## (undated) DEVICE — BANDAGE,ELASTIC,ESMARK,STERILE,4"X9',LF: Brand: MEDLINE

## (undated) DEVICE — SUTURE ETHLN SZ 3-0 L18IN NONABSORBABLE BLK FS-1 L24MM 3/8 663H

## (undated) DEVICE — MERCY HEALTH ST CHARLES: Brand: MEDLINE INDUSTRIES, INC.

## (undated) DEVICE — GOWN,SIRUS,NONRNF,SETINSLV,XL,20/CS: Brand: MEDLINE

## (undated) DEVICE — DRESSING,GAUZE,XEROFORM,CURAD,1"X8",ST: Brand: CURAD

## (undated) DEVICE — Device

## (undated) DEVICE — ZIMMER® STERILE DISPOSABLE TOURNIQUET CUFF WITH PLC, DUAL PORT, SINGLE BLADDER, 18 IN. (46 CM)